# Patient Record
Sex: FEMALE | Race: BLACK OR AFRICAN AMERICAN | HISPANIC OR LATINO | Employment: FULL TIME | URBAN - METROPOLITAN AREA
[De-identification: names, ages, dates, MRNs, and addresses within clinical notes are randomized per-mention and may not be internally consistent; named-entity substitution may affect disease eponyms.]

---

## 2023-07-27 ENCOUNTER — OFFICE VISIT (OUTPATIENT)
Dept: FAMILY MEDICINE CLINIC | Facility: CLINIC | Age: 43
End: 2023-07-27
Payer: COMMERCIAL

## 2023-07-27 VITALS
HEART RATE: 59 BPM | WEIGHT: 172 LBS | DIASTOLIC BLOOD PRESSURE: 90 MMHG | HEIGHT: 62 IN | RESPIRATION RATE: 16 BRPM | OXYGEN SATURATION: 99 % | SYSTOLIC BLOOD PRESSURE: 120 MMHG | BODY MASS INDEX: 31.65 KG/M2

## 2023-07-27 DIAGNOSIS — Z00.00 ANNUAL PHYSICAL EXAM: Primary | ICD-10-CM

## 2023-07-27 DIAGNOSIS — G47.9 SLEEP DISTURBANCE: ICD-10-CM

## 2023-07-27 DIAGNOSIS — K59.00 CONSTIPATION, UNSPECIFIED CONSTIPATION TYPE: ICD-10-CM

## 2023-07-27 DIAGNOSIS — Z11.59 NEED FOR HEPATITIS C SCREENING TEST: ICD-10-CM

## 2023-07-27 DIAGNOSIS — Z13.228 SCREENING FOR METABOLIC DISORDER: ICD-10-CM

## 2023-07-27 DIAGNOSIS — Z11.4 SCREENING FOR HIV (HUMAN IMMUNODEFICIENCY VIRUS): ICD-10-CM

## 2023-07-27 PROCEDURE — 99386 PREV VISIT NEW AGE 40-64: CPT | Performed by: FAMILY MEDICINE

## 2023-07-27 RX ORDER — LANOLIN ALCOHOL/MO/W.PET/CERES
3 CREAM (GRAM) TOPICAL
Qty: 90 TABLET | Refills: 1 | Status: SHIPPED | OUTPATIENT
Start: 2023-07-27

## 2023-07-27 NOTE — PROGRESS NOTES
[address] [facility]    NAME: Ly Haskins  AGE: 41 y.o. SEX: female  : 1980     DATE: 2023     Assessment and Plan:     Problem List Items Addressed This Visit    None  Visit Diagnoses     Annual physical exam    -  Primary    Sleep disturbance        Relevant Medications    melatonin 3 mg    Need for hepatitis C screening test        Relevant Orders    Hepatitis C Antibody    Screening for HIV (human immunodeficiency virus)        Relevant Orders    HIV 1/2 AG/AB w Reflex [facility] for 2 yr old and above    Screening for metabolic disorder        Relevant Orders    CBC and differential    Comprehensive metabolic panel    Hemoglobin A1C    Lipid Panel with Direct LDL reflex    Vitamin D 25 hydroxy    Constipation, unspecified constipation type        Relevant Medications    psyllium (METAMUCIL) 58.6 % powder        Patient received Tdap booster around 5 years ago. Immunizations and preventive care screenings were discussed with patient today. Appropriate education was printed on patient's after visit summary. Counseling:  Alcohol/drug use: discussed moderation in alcohol intake, the recommendations for healthy alcohol use, and avoidance of illicit drug use. Dental Health: discussed importance of regular tooth brushing, flossing, and dental visits. Injury prevention: discussed safety/seat belts, safety helmets, smoke detectors, carbon dioxide detectors, and smoking near bedding or upholstery. Sexual health: discussed sexually transmitted diseases, partner selection, use of condoms, avoidance of unintended pregnancy, and contraceptive alternatives. · Exercise: the importance of regular exercise/physical activity was discussed. Recommend exercise 3-5 times per week for at least 30 minutes. BMI Counseling: Body mass index is 31.46 kg/m².  The BMI is above normal. Nutrition recommendations include decreasing portion sizes, encouraging healthy choices of fruits and vegetables, decreasing fast food intake, consuming healthier snacks, limiting drinks that contain sugar, moderation in carbohydrate intake, increasing intake of lean protein, reducing intake of saturated and trans fat and reducing intake of cholesterol. Exercise recommendations include moderate physical activity 150 minutes/week and exercising 3-5 times per week. No pharmacotherapy was ordered. Rationale for BMI follow-up plan is due to patient being overweight or obese. Depression Screening and Follow-up Plan: Patient was screened for depression during today's encounter. They screened negative with a PHQ-2 score of 2. Return if symptoms worsen or fail to improve. Chief Complaint:     Chief Complaint   Patient presents with   • Physical Exam     New patient. Works remote, lives here in [place], but works for [place]. She is looking for a referral for mental health professional.      History of Present Illness:     Adult Annual Physical   Patient here for a comprehensive physical exam. The patient reports Sleep problem and dysphoric mood. Patient moved from [place] a couple months ago after splitting with her ex-partner. She has 4 children, and 2 of them are living with her now. Her partner is keeping their family dog. Patient states that she is more active and feels energetic whenever she brings the family dog to visit her. However she noticed dysphoric mood and feel depressed without the dog. She works remotely. Sleeps about 4-5 times a day. She is working out at home as much as possible, but now thinking about going to the gym regularly. Trying intermittent fasting on some days. Patient's grandmother passed away from cancer of unknown origin. Diet and Physical Activity  · Diet/Nutrition: well balanced diet. · Exercise: walking.       Depression Screening  PHQ-2/9 Depression Screening    Little interest or pleasure in doing things: 1 - several days  Feeling down, depressed, or hopeless: 1 - several days  PHQ-2 Score: 2  PHQ-2 Interpretation: Negative depression screen       General Health  · Sleep: sleeps poorly and gets 4-6 hours of sleep on average. · Hearing: normal - bilateral.  · Vision: no vision problems. · Dental: regular dental visits. /GYN Health  · Patient is: premenopausal  · Last menstrua l period: 2023     Review of Systems:     Review of Systems   Constitutional: Negative for chills and fever. HENT: Negative for ear pain and sore throat. Eyes: Negative for pain and visual disturbance. Respiratory: Negative for cough and shortness of breath. Cardiovascular: Negative for chest pain and palpitations. Gastrointestinal: Negative for abdominal pain and vomiting. Genitourinary: Negative for dysuria and hematuria. Musculoskeletal: Negative for arthralgias and back pain. Skin: Negative for color change and rash. Neurological: Negative for seizures and syncope. Psychiatric/Behavioral: Positive for dysphoric mood. All other systems reviewed and are negative. Past Medical History:     Past Medical History:   Diagnosis Date   • HPV (human papilloma virus) infection    • Sickle cell trait ([address])       Past Surgical History:     Past Surgical History:   Procedure Laterality Date   •  SECTION      4      Social History:     Social History     Socioeconomic History   • Marital status:      Spouse name: None   • Number of children: None   • Years of education: None   • Highest education level: None   Occupational History   • None   Tobacco Use   • Smoking status: Never   • Smokeless tobacco: Never   Vaping Use   • Vaping Use: Never used   Substance and Sexual Activity   • Alcohol use:  Yes   • Drug use: Not Currently     Types: Marijuana     Comment: Use to smoke marijuana   • Sexual activity: Yes   Other Topics Concern   • None   Social History Narrative   • None     Social Determinants of Health     Financial Resource Strain: Not on file   Food Insecurity: Not on file   Transportation Needs: Not on file   Physical Activity: Not on file   Stress: Not on file   Social Connections: Not on file   Intimate Partner Violence: Not on file   Housing Stability: Not on file      Family History:     Family History   Problem Relation Age of Onset   • Asthma Mother    • Hypertension Mother    • Asthma Brother    • Heart attack Brother    • ADD / ADHD Son    • Asthma Daughter    • Eczema Daughter    • ADD / ADHD Daughter    • Febrile seizures Daughter    • Cancer Maternal Grandmother       Current Medications:     Current Outpatient Medications   Medication Sig Dispense Refill   • melatonin 3 mg Take 1 tablet (3 mg total) by mouth daily at bedtime 90 tablet 1   • psyllium (METAMUCIL) 58.6 % powder Take 1 packet by mouth 3 (three) times a day 660 g 2     No current facility-administered medications for this visit. Allergies:     No Known Allergies   Physical Exam:     /90 (BP Location: Right arm, Patient Position: Sitting, Cuff Size: Standard)   Pulse 59   Resp 16   Ht 5' 2" (1.575 m)   Wt 78 kg (172 lb)   LMP 07/22/2023 (Exact Date)   SpO2 99%   BMI 31.46 kg/m²     Physical Exam  Vitals and nursing note reviewed. Constitutional:       General: She is not in acute distress. Appearance: Normal appearance. She is well-developed. HENT:      Head: Normocephalic and atraumatic. Right Ear: Tympanic membrane, ear canal and external ear normal. There is no impacted cerumen. Left Ear: Tympanic membrane, ear canal and external ear normal. There is no impacted cerumen. Nose: Nose normal. No congestion. Mouth/Throat:      Mouth: Mucous membranes are moist.      Pharynx: No posterior oropharyngeal erythema. Eyes:      Extraocular Movements: Extraocular movements intact. Conjunctiva/sclera: Conjunctivae normal.      Pupils: Pupils are equal, round, and reactive to light. Cardiovascular:      Rate and Rhythm: Normal rate and regular rhythm. Pulses: Normal pulses. Heart sounds: Normal heart sounds. No murmur heard. Pulmonary:      Effort: Pulmonary effort is normal. No respiratory distress. Breath sounds: Normal breath sounds. Abdominal:      General: Bowel sounds are normal.      Palpations: Abdomen is soft. Tenderness: There is no abdominal tenderness. Musculoskeletal:         General: No swelling. Cervical back: Neck supple. Right lower leg: No edema. Left lower leg: No edema. Skin:     General: Skin is warm and dry. Capillary Refill: Capillary refill takes less than 2 seconds. Neurological:      General: No focal deficit present. Mental Status: She is alert. Motor: No weakness.    Psychiatric:         Mood and Affect: Mood normal.          Javier Jett MD  [facility]

## 2023-10-12 ENCOUNTER — OFFICE VISIT (OUTPATIENT)
Dept: FAMILY MEDICINE CLINIC | Facility: CLINIC | Age: 43
End: 2023-10-12
Payer: COMMERCIAL

## 2023-10-12 VITALS
SYSTOLIC BLOOD PRESSURE: 120 MMHG | DIASTOLIC BLOOD PRESSURE: 82 MMHG | OXYGEN SATURATION: 96 % | HEIGHT: 62 IN | WEIGHT: 181.1 LBS | HEART RATE: 69 BPM | BODY MASS INDEX: 33.33 KG/M2 | RESPIRATION RATE: 13 BRPM

## 2023-10-12 DIAGNOSIS — R45.86 MOOD CHANGE: ICD-10-CM

## 2023-10-12 DIAGNOSIS — G56.03 BILATERAL CARPAL TUNNEL SYNDROME: Primary | ICD-10-CM

## 2023-10-12 PROCEDURE — 99213 OFFICE O/P EST LOW 20 MIN: CPT | Performed by: FAMILY MEDICINE

## 2023-10-12 NOTE — PATIENT INSTRUCTIONS
Outpatient [facility] Suicide and Crisis [id number] (call or text)   Crisis Text Line Text HOME to [phone] or Message on Facebook ([address])   [facility] [phone]   [address] [place] [address] [phone] (call or text)   Twan Copeland [phone]     [facility] for Counseling and Psychotherapy Services  Address: [address] 101 [facility], [facility]  Phone: [phone]; Children and adults of all ages for counseling; In-person and/or telehealth; Accepts Medicare and out of network benefits    [address]  Address: [address] [facility] 85 [facility], [address] [place] [address] 64 [facility]  Phone: [phone]  [address] [place] [address]; Ages 13+ for psychotherapy, medication management and evaluation; Accepts Cigna, Horizon BC BS, Humana Inc, [employer], Aetna, and [facility]  Address: [address] [address], [address] [facility] [place], [address] [place] [address]  Phone: [phone]  English; Ages 5+ for individual, family, and couples counseling; In-person and/or telehealth services; Accepts Horizon BCBS, [place], Diane Adknowledge, and Kaseya  Address: [address], [place], [address]  Phone: [phone]  Bilingual; Ages 4+ for family, individual, play therapy, psychiatric evaluations, and medication monitoring; Accepts Medicaid and Medicare    [facility]  Address: [address], [address] [facility]  Phone:  [phone]  Bilingual; Ages 4+ for counseling and 18+ for psychiatry and med management; [address] for [facility]  Address: [address] [place] [address]  Phone: [phone]  Bilingual; Ages 5+ for counseling and psychiatry; Accept Medicare, Medicaid, and some commercial insurances  [facility]  Various locations  Phone: [phone] Ext.  80  Bilingual; Ages 3+ for counseling, psychiatry, and medication management; Accepts ONLY Medicaid    [facility], [facility]  Address: [address]  Phone: [phone]; Ages 6+ for counseling; Accepts Medicare, Medicaid, and some commercial insurances      [facility]  Address: [address] [facility] [address], [address], 6[address] [facility]  Phone: [phone]  Bilingual; Ages 3+ for counseling; In-person and/or telehealth; Accept Horizon [place], [place], [employer], Optum, Two [facility], [place], Allie Rios, [place] and self-pay    [facility]  Address: [address] [facility] [address]  Phone: [phone]  English; Ages 18+ for counseling; Accepts Medicare, Medicaid, and some commercial insurances    [address]  Address: [address]  Phone: [phone]  English; Ages 18+ for counseling; Accepts Medicare, Medicaid, and some commercial insurances    DARIO Iqbal, LCSW  Address: [address]  Phone: [phone]  [address] [place] [address]; Ages 10+ for counseling; Accepts Medicare, Medicaid, and some [address]  Address: [address] [address]  Phone: [phone]  English; Ages 6+ for counseling, psychiatry, and medication management; ONLY telehealth; Accepts Medicaid, Amerigroup, Michael Fontan, [facility], and Estée Gabriel (Medication Management Only)    [facility]  Address: [address]  Phone: [phone]  English; Ages 5+ for psychiatric evaluation and medication management; Accepts self-pay, Cigna, [employer] (HMO & PPO), Most BC/BS plans, [place], Two [facility] and [facility]  Address: [address]  Phone: [phone]  English; Ages 10+ for counseling;  Accepts Medicare and some [address] [facility]  Address: [address] [place] [address] [address]  Phone: [phone]  Bilingual; Ages 3+ for counseling, psychiatry, and medication management; Accepts Medicaid, Medicare, and [address]  Address: [address]  Phone: [phone]  Bilingual; Ages 5+ for counseling and psychiatry; Accepts Medicaid insurance; Offer medication management    [address] [facility]  Address: 1501 [facility] 1000 [facility] [address]  Phone: [phone]  English; Children and adults of all ages for counseling, psychiatric evaluation and medication management; Accepts Medicare and some commercial plans    [facility]  Address: [address]  Intake Number: [phone]  Phone: [phone]  Bilingual; Ages 5+ for counseling, psychiatry, and medication management; Does NOT accept Medicaid; Offers therapy, psychiatry, and medication management    [facility]  Address: [facility], [address] [place] [address]  Phone: [phone]  Bilingual; Only ages 11 to 24 for behavioral health, intellectual/developmental disability, or substance use; Accepts Medicaid, Medicare, and some commercial plans    [facility])  Address: [address]  Phone: [phone]  English; Ages 2-20 children for counseling; Accepts ONLY Medicaid    [facility]  Address: [address] [place] [address]  Phone: [phone]  English; Ages 4+ for counseling; Accepts Medicaid and some commercial insurances    For any additional questions or concerns, please contact the [address] [place] [address] or the office for further assistance.   Danielle Hatch, DARIO [facility] [phone]

## 2023-10-12 NOTE — PROGRESS NOTES
[facility] Office visit    Assessment/Plan:     1. Bilateral carpal tunnel syndrome  -     Ambulatory Referral to Orthopedic Surgery; Future    2. Mood change  -     Ambulatory referral to [employer]; Future    Referral sent to hand orthopedics, Dr. Carmelina Gomez for carpal tunnel syndrome. Recommend using wrist braces. Recommend to alternate tylenol and ibuprofen prior to seeing the orthopedics. As for the letter for emotional support animal, behavioral/psych health referral was sent. Also recommended patient to certify her dog as emotional support animal.      Return if symptoms worsen or fail to improve. Subjective:   STEFANIA Handley is a 41 y.o. female presents today to be evaluated for worsening wrist and hand pains bilaterally. Patient was diagnosed with carpal tunnel on bilateral hands about a year ago in [place]. Patient states she was prescribed a medication to be taken at night about 1 year ago. She stopped taking the medication a couple months ago with improved symptoms. She is unable to recall the name of the medication. Patient states she underwent EMS testing to be diagnosed for carpal tunnel syndrome. She currently uses ergonomic mouse and keyboard at work. She works as a . Patient has not had finger injections. She also requested to be seen by therapist regarding a letter to have emotional support dog. Review of Systems   Constitutional:  Negative for chills and fever. HENT:  Negative for ear pain and sore throat. Eyes:  Negative for pain and visual disturbance. Respiratory:  Negative for cough and shortness of breath. Cardiovascular:  Negative for chest pain and palpitations. Gastrointestinal:  Negative for abdominal pain and vomiting. Genitourinary:  Negative for dysuria and hematuria. Musculoskeletal:  Negative for arthralgias and back pain. Skin:  Negative for color change and rash.    Neurological:  Negative for seizures and syncope. All other systems reviewed and are negative. Objective:     /82 (BP Location: Left arm, Patient Position: Sitting, Cuff Size: Standard)   Pulse 69   Resp 13   Ht 5' 2" (1.575 m)   Wt 82.1 kg (181 lb 1.6 oz)   SpO2 96%   BMI 33.12 kg/m²      Physical Exam  Vitals reviewed. Constitutional:       General: She is not in acute distress. Appearance: Normal appearance. HENT:      Head: Normocephalic and atraumatic. Eyes:      Pupils: Pupils are equal, round, and reactive to light. Cardiovascular:      Rate and Rhythm: Normal rate and regular rhythm. Pulses: Normal pulses. Heart sounds: Normal heart sounds. No murmur heard. Pulmonary:      Effort: Pulmonary effort is normal. No respiratory distress. Breath sounds: Normal breath sounds. No wheezing. Abdominal:      General: Bowel sounds are normal.      Palpations: Abdomen is soft. Musculoskeletal:      Comments: Slight thenar eminence atrophy on left hand compared to the right. No joint swelling  Numbness on tinel's and phalen's maneuver   Normal strength    Skin:     General: Skin is warm and dry. Neurological:      General: No focal deficit present. Mental Status: She is alert and oriented to person, place, and time.    Psychiatric:         Mood and Affect: Mood normal.         Behavior: Behavior normal.          ** Please Note: This note has been constructed using a voice recognition system **     Julia Humphrey MD  10/12/23  6:02 PM

## 2023-10-13 ENCOUNTER — TELEPHONE (OUTPATIENT)
Dept: PSYCHIATRY | Facility: CLINIC | Age: 43
End: 2023-10-13

## 2023-10-13 NOTE — TELEPHONE ENCOUNTER
Spoke to patient in regards to routine referral, inform patient of the wait list and patient as been placed on proper wait list. THOMAS-danelle or [place], in person or virtual. Male or female.

## 2023-10-20 ENCOUNTER — OFFICE VISIT (OUTPATIENT)
Dept: OBGYN CLINIC | Facility: CLINIC | Age: 43
End: 2023-10-20
Payer: COMMERCIAL

## 2023-10-20 VITALS
SYSTOLIC BLOOD PRESSURE: 135 MMHG | DIASTOLIC BLOOD PRESSURE: 78 MMHG | WEIGHT: 181 LBS | BODY MASS INDEX: 33.31 KG/M2 | HEART RATE: 78 BPM | HEIGHT: 62 IN

## 2023-10-20 DIAGNOSIS — R20.0 NUMBNESS AND TINGLING IN BOTH HANDS: Primary | ICD-10-CM

## 2023-10-20 DIAGNOSIS — G56.03 BILATERAL CARPAL TUNNEL SYNDROME: ICD-10-CM

## 2023-10-20 DIAGNOSIS — R20.2 NUMBNESS AND TINGLING IN BOTH HANDS: Primary | ICD-10-CM

## 2023-10-20 PROCEDURE — 99203 OFFICE O/P NEW LOW 30 MIN: CPT | Performed by: STUDENT IN AN ORGANIZED HEALTH CARE EDUCATION/TRAINING PROGRAM

## 2023-10-20 NOTE — PROGRESS NOTES
ASSESSMENT/PLAN:    Assessment:   Bilateral hand numbness and tingling     Plan: It was discussed with Thuy Núñez that her symptoms are not consistent with carpal tunnel syndrome as most of her numbness is on the dorsum of her hand and sudden numbness in the entire upper extremity at night . She reports she previously had an EMG/NCS preformed diagnosing her with CTS last year. I asked if she could provide us with this study, she will try. Bilateral upper extremity EMG was ordered to further evaluate the numbness and tingling. If she is able to provide me with her previous EMG, she may cancel the one that I ordered. Follow Up: After Testing    To Do Next Visit:  Review EMG results     _____________________________________________________  CHIEF COMPLAINT:  Chief Complaint   Patient presents with   • Left Hand - Numbness   • Right Hand - Numbness         SUBJECTIVE:  Leigh Ann Smith is a 41 y.o. female who presents with bilateral hand numbness and tingling. I am seeing hTuy Núñez in consultation at the request of Dr. Isidoro Ledezma MD. She states she was diagnosed with carpal tunnel syndrome in [facility] approx. 1 year ago by her PCP. She did undergo a EMG at that time. She notes intermittent numbness and tingling for approx. 4 years. She feels her symptoms have worsened, as she is now having pain as well as numbness and tingling. Numbness and tingling is to the dorsal aspect of her hand. She did make her work station more ergonomic. Her symptoms can wake her from sleep at night when her whole right arm goes numb. At times she states her left index finger will lock in extension.    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     PAST MEDICAL HISTORY:  Past Medical History:   Diagnosis Date   • HPV (human papilloma virus) infection    • Sickle cell trait ([address])        PAST SURGICAL HISTORY:  Past Surgical History:   Procedure Laterality Date   •  SECTION      4       FAMILY HISTORY:  Family History   Problem Relation Age of Onset   • Asthma Mother    • Hypertension Mother    • Asthma Brother    • Heart attack Brother    • ADD / ADHD Son    • Asthma Daughter    • Eczema Daughter    • ADD / ADHD Daughter    • Febrile seizures Daughter    • Cancer Maternal Grandmother        SOCIAL HISTORY:  Social History     Tobacco Use   • Smoking status: Never   • Smokeless tobacco: Never   Vaping Use   • Vaping Use: Never used   Substance Use Topics   • Alcohol use: Yes   • Drug use: Not Currently     Types: Marijuana     Comment: Use to smoke marijuana       MEDICATIONS:    Current Outpatient Medications:   •  melatonin 3 mg, Take 1 tablet (3 mg total) by mouth daily at bedtime, Disp: 90 tablet, Rfl: 1  •  psyllium (METAMUCIL) 58.6 % powder, Take 1 packet by mouth 3 (three) times a day, Disp: 660 g, Rfl: 2    ALLERGIES:  No Known Allergies    REVIEW OF SYSTEMS:  Pertinent items are noted in HPI. A comprehensive review of systems was negative.     LABS:  HgA1c: No results found for: "HGBA1C"  BMP: No results found for: "GLUCOSE", "CALCIUM", "NA", "K", "CO2", "CL", "BUN", "CREATININE"      _____________________________________________________  PHYSICAL EXAMINATION:  Vital signs: /78   Pulse 78   Ht 5' 2" (1.575 m)   Wt 82.1 kg (181 lb)   BMI 33.11 kg/m²   General: well developed and well nourished, alert, oriented times 3, and appears comfortable  Psychiatric: Normal  HEENT: Trachea Midline, No torticollis  Cardiovascular: No discernable arrhythmia  Pulmonary: No wheezing or stridor  Abdomen: No rebound or guarding  Extremities: No peripheral edema  Skin: No masses, erythema, lacerations, fluctation, ulcerations  Neurovascular: Sensation Intact to the Median, Ulnar, Radial Nerve, Motor Intact to the Median, Ulnar, Radial Nerve, and Pulses Intact    MUSCULOSKELETAL EXAMINATION:    Bilateral upper extremities:   No erythema, ecchymosis or edema   - Durkan's bilaterally   - tinel's at the carpal tunnel but does cause radiating symptoms up the forearm on the right  + tinel's on the left     FPL 5/5 bilaterally   APB 5/5 bilaterally   Mild thenar atrophy on the right   No thenar atrophy on the left   2 point discrimination intact at 5 mm bilaterally in the median and ulnar nerve distribution    Shoulder abduction 5/5 bilaterally   Elbow flexion 5/5 bilaterally   Elbow extension 5/5 bilaterally   Wrist extension 5/5 bilaterally   Wrist flexion 5/5 bilaterally   Intrinsics 5/5 bilaterally    strength 5/5    + stoddard's on the left   - stoddard's on the right   - Spurling's bilaterally   No A1 pulley tenderness left index finger     _____________________________________________________  STUDIES REVIEWED:  No Studies to review      PROCEDURES PERFORMED:  Procedures  No Procedures performed today      Scribe Attestation    I,:  Elfredia Blizzard am acting as a scribe while in the presence of the attending physician.:       I,:  Celestine Corcoran MD personally performed the services described in this documentation    as scribed in my presence.:

## 2024-02-20 ENCOUNTER — OFFICE VISIT (OUTPATIENT)
Dept: FAMILY MEDICINE CLINIC | Facility: CLINIC | Age: 44
End: 2024-02-20
Payer: COMMERCIAL

## 2024-02-20 VITALS
SYSTOLIC BLOOD PRESSURE: 104 MMHG | WEIGHT: 181.25 LBS | HEART RATE: 63 BPM | DIASTOLIC BLOOD PRESSURE: 68 MMHG | BODY MASS INDEX: 34.22 KG/M2 | RESPIRATION RATE: 21 BRPM | OXYGEN SATURATION: 99 % | HEIGHT: 61 IN | TEMPERATURE: 98.2 F

## 2024-02-20 DIAGNOSIS — R21 RASH AND NONSPECIFIC SKIN ERUPTION: Primary | ICD-10-CM

## 2024-02-20 PROCEDURE — 99213 OFFICE O/P EST LOW 20 MIN: CPT | Performed by: FAMILY MEDICINE

## 2024-02-20 RX ORDER — NYSTATIN 100000 U/G
CREAM TOPICAL 2 TIMES DAILY
Qty: 30 G | Refills: 0 | Status: SHIPPED | OUTPATIENT
Start: 2024-02-20

## 2024-02-20 RX ORDER — ELECTROLYTES/DEXTROSE
SOLUTION, ORAL ORAL
COMMUNITY

## 2024-02-20 NOTE — PROGRESS NOTES
Family Medicine Office Visit  Molina Frias 43 y.o. female   MRN: [id number] : 1980  ENCOUNTER: 2024    Assessment and Plan     1. Rash and nonspecific skin eruption  Assessment & Plan:  Patient with hyperpigmentation at oral commissures   Likely cheilosis, new soap could have been initial trigger  Trial of nystatin cream sent  RTO if no improvement    Orders:  -     nystatin (MYCOSTATIN) cream; Apply topically 2 (two) times a day        Return if symptoms worsen or fail to improve.      Associated orders were discussed and explained to the patient, they expressed understanding and acceptance with A/P. Patient will call the office if any further questions/concerns.     Assessment and plan was discussed with attending physician    Chief Complaint     Chief Complaint   Patient presents with    Follow-up     Eczema - patient stopped using a face soap and now she has white and black spots on her face around her mouth        History of Present Illness     Molina Frias is a 43 y.o.-year-old female who presents today for rash.    Rash  This is a new problem. Episode onset: 4 weeks ago. The affected locations include the face (around lips). She was exposed to a new detergent/soap. Pertinent negatives include no cough, facial edema, fever, itching, joint pain or shortness of breath. Past treatments include moisturizer (vaseline). The treatment provided mild relief. There were no sick contacts.       Current Outpatient Medications on File Prior to Visit   Medication Sig    melatonin 3 mg Take 1 tablet (3 mg total) by mouth daily at bedtime    Multiple Vitamin (Multivitamin Adult) TABS Take by mouth    psyllium (METAMUCIL) 58.6 % powder Take 1 packet by mouth 3 (three) times a day (Patient not taking: Reported on 2024)       Review of Systems     Review of Systems   Constitutional:  Negative for chills and fever.   Respiratory:  Negative for cough and shortness of breath.    Cardiovascular:  Negative for  "chest pain.   Gastrointestinal:  Negative for abdominal pain.   Musculoskeletal:  Negative for joint pain.   Skin:  Positive for rash. Negative for itching.       Objective     /68 (BP Location: Right arm, Patient Position: Sitting, Cuff Size: Large)   Pulse 63   Temp 98.2 °F (36.8 °C) (Temporal)   Resp 21   Ht 5' 1\" (1.549 m)   Wt 82.2 kg (181 lb 4 oz)   LMP 01/24/2024 (Approximate)   SpO2 99%   BMI 34.25 kg/m²     Physical Exam  Constitutional:       General: She is not in acute distress.     Appearance: Normal appearance.   Cardiovascular:      Rate and Rhythm: Normal rate and regular rhythm.   Pulmonary:      Effort: Pulmonary effort is normal. No respiratory distress.      Breath sounds: Normal breath sounds.   Skin:     Findings: Rash (hyperpigmentation at oral commissures) present.   Neurological:      Mental Status: She is alert.           Darlyn Lewis MD  Family Medicine PGY-3  [facility]         Parts of this note were dictated using M*Modal dictation software and may have sounds-like errors due to variation in pronunciation.  "

## 2024-02-23 ENCOUNTER — HOSPITAL ENCOUNTER (OUTPATIENT)
Dept: NEUROLOGY | Facility: HOSPITAL | Age: 44
End: 2024-02-23
Attending: STUDENT IN AN ORGANIZED HEALTH CARE EDUCATION/TRAINING PROGRAM
Payer: COMMERCIAL

## 2024-02-23 DIAGNOSIS — R20.0 NUMBNESS AND TINGLING IN BOTH HANDS: ICD-10-CM

## 2024-02-23 DIAGNOSIS — R20.2 NUMBNESS AND TINGLING IN BOTH HANDS: ICD-10-CM

## 2024-02-23 PROCEDURE — 95912 NRV CNDJ TEST 11-12 STUDIES: CPT | Performed by: PSYCHIATRY & NEUROLOGY

## 2024-02-23 PROCEDURE — 95886 MUSC TEST DONE W/N TEST COMP: CPT | Performed by: PSYCHIATRY & NEUROLOGY

## 2024-02-25 PROBLEM — R21 RASH AND NONSPECIFIC SKIN ERUPTION: Status: ACTIVE | Noted: 2024-02-25

## 2024-02-25 NOTE — ASSESSMENT & PLAN NOTE
Patient with hyperpigmentation at oral commissures   Likely cheilosis, new soap could have been initial trigger  Trial of nystatin cream sent  RTO if no improvement

## 2024-08-22 ENCOUNTER — APPOINTMENT (EMERGENCY)
Dept: RADIOLOGY | Facility: HOSPITAL | Age: 44
End: 2024-08-22
Payer: COMMERCIAL

## 2024-08-22 ENCOUNTER — HOSPITAL ENCOUNTER (EMERGENCY)
Facility: HOSPITAL | Age: 44
Discharge: HOME | End: 2024-08-22
Attending: STUDENT IN AN ORGANIZED HEALTH CARE EDUCATION/TRAINING PROGRAM
Payer: COMMERCIAL

## 2024-08-22 VITALS
OXYGEN SATURATION: 100 % | BODY MASS INDEX: 34.55 KG/M2 | RESPIRATION RATE: 16 BRPM | DIASTOLIC BLOOD PRESSURE: 65 MMHG | WEIGHT: 183 LBS | HEART RATE: 85 BPM | HEIGHT: 61 IN | SYSTOLIC BLOOD PRESSURE: 118 MMHG | TEMPERATURE: 99.4 F

## 2024-08-22 DIAGNOSIS — N12 PYELONEPHRITIS: Primary | ICD-10-CM

## 2024-08-22 LAB
ALBUMIN SERPL BCG-MCNC: 4.5 G/DL (ref 3.5–5)
ALP SERPL-CCNC: 89 U/L (ref 34–104)
ALT SERPL W P-5'-P-CCNC: 13 U/L (ref 7–52)
ANION GAP SERPL CALCULATED.3IONS-SCNC: 11 MMOL/L (ref 4–13)
AST SERPL W P-5'-P-CCNC: 17 U/L (ref 13–39)
BACTERIA UR QL AUTO: ABNORMAL /HPF
BASOPHILS # BLD AUTO: 0.09 THOUSANDS/ÂΜL (ref 0–0.1)
BASOPHILS NFR BLD AUTO: 1 % (ref 0–1)
BILIRUB SERPL-MCNC: 0.61 MG/DL (ref 0.2–1)
BILIRUB UR QL STRIP: NEGATIVE
BUN SERPL-MCNC: 9 MG/DL (ref 5–25)
CALCIUM SERPL-MCNC: 9.6 MG/DL (ref 8.4–10.2)
CHLORIDE SERPL-SCNC: 99 MMOL/L (ref 96–108)
CLARITY UR: CLEAR
CO2 SERPL-SCNC: 24 MMOL/L (ref 21–32)
COLOR UR: YELLOW
CREAT SERPL-MCNC: 0.99 MG/DL (ref 0.6–1.3)
EOSINOPHIL # BLD AUTO: 0.13 THOUSAND/ÂΜL (ref 0–0.61)
EOSINOPHIL NFR BLD AUTO: 1 % (ref 0–6)
ERYTHROCYTE [DISTWIDTH] IN BLOOD BY AUTOMATED COUNT: 13.6 % (ref 11.6–15.1)
EXT PREGNANCY TEST URINE: NEGATIVE
EXT. CONTROL: NORMAL
GFR SERPL CREATININE-BSD FRML MDRD: 70 ML/MIN/1.73SQ M
GLUCOSE SERPL-MCNC: 89 MG/DL (ref 65–140)
GLUCOSE UR STRIP-MCNC: NEGATIVE MG/DL
HCT VFR BLD AUTO: 39.1 % (ref 34.8–46.1)
HGB BLD-MCNC: 12.8 G/DL (ref 11.5–15.4)
HGB UR QL STRIP.AUTO: ABNORMAL
IMM GRANULOCYTES # BLD AUTO: 0.04 THOUSAND/UL (ref 0–0.2)
IMM GRANULOCYTES NFR BLD AUTO: 0 % (ref 0–2)
KETONES UR STRIP-MCNC: ABNORMAL MG/DL
LEUKOCYTE ESTERASE UR QL STRIP: ABNORMAL
LIPASE SERPL-CCNC: 11 U/L (ref 11–82)
LYMPHOCYTES # BLD AUTO: 1.79 THOUSANDS/ÂΜL (ref 0.6–4.47)
LYMPHOCYTES NFR BLD AUTO: 16 % (ref 14–44)
MCH RBC QN AUTO: 27.5 PG (ref 26.8–34.3)
MCHC RBC AUTO-ENTMCNC: 32.7 G/DL (ref 31.4–37.4)
MCV RBC AUTO: 84 FL (ref 82–98)
MONOCYTES # BLD AUTO: 1.22 THOUSAND/ÂΜL (ref 0.17–1.22)
MONOCYTES NFR BLD AUTO: 11 % (ref 4–12)
MUCOUS THREADS UR QL AUTO: ABNORMAL
NEUTROPHILS # BLD AUTO: 7.73 THOUSANDS/ÂΜL (ref 1.85–7.62)
NEUTS SEG NFR BLD AUTO: 71 % (ref 43–75)
NITRITE UR QL STRIP: NEGATIVE
NON-SQ EPI CELLS URNS QL MICRO: ABNORMAL /HPF
NRBC BLD AUTO-RTO: 0 /100 WBCS
PH UR STRIP.AUTO: 5 [PH]
PLATELET # BLD AUTO: 384 THOUSANDS/UL (ref 149–390)
PMV BLD AUTO: 10.5 FL (ref 8.9–12.7)
POTASSIUM SERPL-SCNC: 3.5 MMOL/L (ref 3.5–5.3)
PROT SERPL-MCNC: 9 G/DL (ref 6.4–8.4)
PROT UR STRIP-MCNC: NEGATIVE MG/DL
RBC # BLD AUTO: 4.66 MILLION/UL (ref 3.81–5.12)
RBC #/AREA URNS AUTO: ABNORMAL /HPF
SODIUM SERPL-SCNC: 134 MMOL/L (ref 135–147)
SP GR UR STRIP.AUTO: 1.02 (ref 1–1.03)
UROBILINOGEN UR STRIP-ACNC: <2 MG/DL
WBC # BLD AUTO: 11 THOUSAND/UL (ref 4.31–10.16)
WBC #/AREA URNS AUTO: ABNORMAL /HPF

## 2024-08-22 PROCEDURE — 81025 URINE PREGNANCY TEST: CPT | Performed by: STUDENT IN AN ORGANIZED HEALTH CARE EDUCATION/TRAINING PROGRAM

## 2024-08-22 PROCEDURE — 87186 SC STD MICRODIL/AGAR DIL: CPT | Performed by: STUDENT IN AN ORGANIZED HEALTH CARE EDUCATION/TRAINING PROGRAM

## 2024-08-22 PROCEDURE — 81001 URINALYSIS AUTO W/SCOPE: CPT | Performed by: STUDENT IN AN ORGANIZED HEALTH CARE EDUCATION/TRAINING PROGRAM

## 2024-08-22 PROCEDURE — 80053 COMPREHEN METABOLIC PANEL: CPT | Performed by: STUDENT IN AN ORGANIZED HEALTH CARE EDUCATION/TRAINING PROGRAM

## 2024-08-22 PROCEDURE — 36415 COLL VENOUS BLD VENIPUNCTURE: CPT | Performed by: STUDENT IN AN ORGANIZED HEALTH CARE EDUCATION/TRAINING PROGRAM

## 2024-08-22 PROCEDURE — 87086 URINE CULTURE/COLONY COUNT: CPT | Performed by: STUDENT IN AN ORGANIZED HEALTH CARE EDUCATION/TRAINING PROGRAM

## 2024-08-22 PROCEDURE — 99284 EMERGENCY DEPT VISIT MOD MDM: CPT

## 2024-08-22 PROCEDURE — 96365 THER/PROPH/DIAG IV INF INIT: CPT

## 2024-08-22 PROCEDURE — 99285 EMERGENCY DEPT VISIT HI MDM: CPT | Performed by: STUDENT IN AN ORGANIZED HEALTH CARE EDUCATION/TRAINING PROGRAM

## 2024-08-22 PROCEDURE — 85025 COMPLETE CBC W/AUTO DIFF WBC: CPT | Performed by: STUDENT IN AN ORGANIZED HEALTH CARE EDUCATION/TRAINING PROGRAM

## 2024-08-22 PROCEDURE — 74177 CT ABD & PELVIS W/CONTRAST: CPT

## 2024-08-22 PROCEDURE — 87077 CULTURE AEROBIC IDENTIFY: CPT | Performed by: STUDENT IN AN ORGANIZED HEALTH CARE EDUCATION/TRAINING PROGRAM

## 2024-08-22 PROCEDURE — 96375 TX/PRO/DX INJ NEW DRUG ADDON: CPT

## 2024-08-22 PROCEDURE — 83690 ASSAY OF LIPASE: CPT | Performed by: STUDENT IN AN ORGANIZED HEALTH CARE EDUCATION/TRAINING PROGRAM

## 2024-08-22 RX ORDER — ONDANSETRON 4 MG/1
4 TABLET, ORALLY DISINTEGRATING ORAL EVERY 6 HOURS PRN
Qty: 12 TABLET | Refills: 0 | Status: SHIPPED | OUTPATIENT
Start: 2024-08-22

## 2024-08-22 RX ORDER — CEFTRIAXONE 1 G/50ML
1000 INJECTION, SOLUTION INTRAVENOUS ONCE
Status: COMPLETED | OUTPATIENT
Start: 2024-08-22 | End: 2024-08-22

## 2024-08-22 RX ORDER — KETOROLAC TROMETHAMINE 30 MG/ML
15 INJECTION, SOLUTION INTRAMUSCULAR; INTRAVENOUS ONCE
Status: COMPLETED | OUTPATIENT
Start: 2024-08-22 | End: 2024-08-22

## 2024-08-22 RX ORDER — CEFPODOXIME PROXETIL 200 MG/1
200 TABLET, FILM COATED ORAL 2 TIMES DAILY
Qty: 20 TABLET | Refills: 0 | Status: SHIPPED | OUTPATIENT
Start: 2024-08-22 | End: 2024-09-01

## 2024-08-22 RX ADMIN — CEFTRIAXONE 1000 MG: 1 INJECTION, SOLUTION INTRAVENOUS at 17:12

## 2024-08-22 RX ADMIN — KETOROLAC TROMETHAMINE 15 MG: 30 INJECTION, SOLUTION INTRAMUSCULAR at 15:18

## 2024-08-22 RX ADMIN — IOHEXOL 100 ML: 350 INJECTION, SOLUTION INTRAVENOUS at 16:11

## 2024-08-22 NOTE — DISCHARGE INSTRUCTIONS
You were seen in the emergency department for right-sided abdominal pain.  You were found to have pyelonephritis.  He will be started on antibiotics.  Please take as prescribed.  Please follow-up with your family doctor.  Return to the emergency room for any new or concerning symptoms.

## 2024-08-23 ENCOUNTER — OFFICE VISIT (OUTPATIENT)
Age: 44
End: 2024-08-23

## 2024-08-23 VITALS
TEMPERATURE: 97.5 F | OXYGEN SATURATION: 98 % | WEIGHT: 182.4 LBS | RESPIRATION RATE: 18 BRPM | HEART RATE: 72 BPM | HEIGHT: 61 IN | BODY MASS INDEX: 34.44 KG/M2 | DIASTOLIC BLOOD PRESSURE: 84 MMHG | SYSTOLIC BLOOD PRESSURE: 120 MMHG

## 2024-08-23 DIAGNOSIS — Z59.9 FINANCIAL DIFFICULTIES: ICD-10-CM

## 2024-08-23 DIAGNOSIS — N39.0 RECURRENT UTI (URINARY TRACT INFECTION): ICD-10-CM

## 2024-08-23 DIAGNOSIS — Z00.00 ANNUAL PHYSICAL EXAM: Primary | ICD-10-CM

## 2024-08-23 LAB — SL AMB POCT HEMOGLOBIN AIC: 5.5 (ref ?–6.5)

## 2024-08-23 PROCEDURE — 99386 PREV VISIT NEW AGE 40-64: CPT | Performed by: FAMILY MEDICINE

## 2024-08-23 PROCEDURE — 83036 HEMOGLOBIN GLYCOSYLATED A1C: CPT | Performed by: FAMILY MEDICINE

## 2024-08-23 RX ORDER — SULFAMETHOXAZOLE/TRIMETHOPRIM 800-160 MG
1 TABLET ORAL 2 TIMES DAILY
Qty: 14 TABLET | Refills: 0 | Status: CANCELLED | OUTPATIENT
Start: 2024-08-23 | End: 2024-08-30

## 2024-08-23 SDOH — ECONOMIC STABILITY - INCOME SECURITY: PROBLEM RELATED TO HOUSING AND ECONOMIC CIRCUMSTANCES, UNSPECIFIED: Z59.9

## 2024-08-23 NOTE — PROGRESS NOTES
Adult Annual Physical  Name: Molina Frias      : 1980      MRN: [id number]  Encounter Provider: Aida Carranza MD  Encounter Date: 2024   Encounter department: [facility]    Assessment & Plan   1. Annual physical exam  Comments:  reassuing exam. Counseled on emergency symptoms. Will need to close care gaps once social work helps with finances.  2. BMI 34.0-34.9,adult  -     POCT hemoglobin A1c  3. Financial difficulties  -     Ambulatory Referral to Social Work Care Management Program; Future  -     Ambulatory Referral to Complex Care Management Program; Future  4. Recurrent UTI (urinary tract infection)  Assessment & Plan:  Will consider prophylactic UTI medication but will focus on reducing constipation right now. Patient will monitor for fevers, severe pain, nausea/vomiting, reaction to cefpodoxime given from her ED visit yesterday. Explained that severe symptoms require emergency room trip for close monitoring  Immunizations and preventive care screenings were discussed with patient today. Appropriate education was printed on patient's after visit summary.    Counseling:  Dental Health: discussed importance of regular tooth brushing, flossing, and dental visits.    BMI Counseling: Body mass index is 34.46 kg/m². The BMI is above normal. Nutrition recommendations include consuming healthier snacks. Exercise recommendations include strength training exercises. No pharmacotherapy was ordered. Patient referred to PCP. Rationale for BMI follow-up plan is due to patient being overweight or obese.     Depression Screening and Follow-up Plan: Patient was screened for depression during today's encounter. They screened negative with a PHQ-2 score of 0.    Tobacco Cessation Counseling: Tobacco cessation counseling was provided. The patient is sincerely urged to quit consumption of tobacco. She is not ready to quit tobacco. Medication options discussed. Side effects of  "medication not discussed. Patient refused medication.         History of Present Illness     Adult Annual Physical:  Patient presents for annual physical.     Diet and Physical Activity:  - Diet/Nutrition: well balanced diet and consuming 3-5 servings of fruits/vegetables daily.  - Exercise: 3-4 times a week on average.    Depression Screening:  - PHQ-2 Score: 0  - PHQ-9 Score: 0    General Health:  - Sleep: sleeps well.  - Hearing: normal hearing right ear and normal hearing left ear.  - Vision: no vision problems. wearing glasses  - Dental:. waiting on social work and complex care management    /GYN Health:  - Follows with GYN: yes.   - Menopause: perimenopausal.   - Last menstrual cycle: 8/12/2024.   - History of STDs: no  - Contraception:. tubal ligation      Advanced Care Planning:  - Has an advanced directive?: no    - Has a durable medical POA?: no    - ACP document given to patient?: yes      Review of Systems   Constitutional:  Negative for chills and fever.   HENT:  Negative for ear pain and sore throat.    Eyes:  Negative for pain and visual disturbance.   Respiratory:  Negative for cough and shortness of breath.    Cardiovascular:  Negative for chest pain and palpitations.   Gastrointestinal:  Negative for abdominal pain and vomiting.   Endocrine:        Menstrual cycles less and less often   Genitourinary:  Negative for dysuria and hematuria.   Musculoskeletal:  Negative for arthralgias and back pain.   Skin:  Negative for color change and rash.   Neurological:  Negative for seizures and syncope.   Hematological:  Negative for adenopathy.   Psychiatric/Behavioral:  Negative for behavioral problems.    All other systems reviewed and are negative.        Objective     /84 (BP Location: Left arm, Patient Position: Sitting, Cuff Size: Standard)   Pulse 72   Temp 97.5 °F (36.4 °C) (Axillary)   Resp 18   Ht 5' 1\" (1.549 m)   Wt 82.7 kg (182 lb 6.4 oz)   LMP 08/15/2024 (Approximate)   SpO2 98%  "  BMI 34.46 kg/m²     Physical Exam  Vitals and nursing note reviewed.   Constitutional:       General: She is not in acute distress.     Appearance: She is well-developed.   HENT:      Head: Normocephalic and atraumatic.      Nose: No congestion or rhinorrhea.   Eyes:      Conjunctiva/sclera: Conjunctivae normal.   Cardiovascular:      Rate and Rhythm: Normal rate and regular rhythm.      Heart sounds: No murmur heard.  Pulmonary:      Effort: Pulmonary effort is normal. No respiratory distress.      Breath sounds: Normal breath sounds.   Abdominal:      Palpations: Abdomen is soft.      Tenderness: There is no abdominal tenderness.   Musculoskeletal:         General: No swelling.      Cervical back: Normal range of motion.   Skin:     General: Skin is warm and dry.   Neurological:      Mental Status: She is alert and oriented to person, place, and time.   Psychiatric:         Mood and Affect: Mood normal.

## 2024-08-23 NOTE — PATIENT INSTRUCTIONS
"Patient Education     Routine physical for adults   The Basics   Written by the doctors and editors at [facility]   What is a physical? -- A physical is a routine visit, or \"check-up,\" with your doctor. You might also hear it called a \"wellness visit\" or \"preventive visit.\"  During each visit, the doctor will:   Ask about your physical and mental health   Ask about your habits, behaviors, and lifestyle   Do an exam   Give you vaccines if needed   Talk to you about any medicines you take   Give advice about your health   Answer your questions  Getting regular check-ups is an important part of taking care of your health. It can help your doctor find and treat any problems you have. But it's also important for preventing health problems.  A routine physical is different from a \"sick visit.\" A sick visit is when you see a doctor because of a health concern or problem. Since physicals are scheduled ahead of time, you can think about what you want to ask the doctor.  How often should I get a physical? -- It depends on your age and health. In general, for people age 21 years and older:   If you are younger than 50 years, you might be able to get a physical every 3 years.   If you are 50 years or older, your doctor might recommend a physical every year.  If you have an ongoing health condition, like diabetes or high blood pressure, your doctor will probably want to see you more often.  What happens during a physical? -- In general, each visit will include:   Physical exam - The doctor or nurse will check your height, weight, heart rate, and blood pressure. They will also look at your eyes and ears. They will ask about how you are feeling and whether you have any symptoms that bother you.   Medicines - It's a good idea to bring a list of all the medicines you take to each doctor visit. Your doctor will talk to you about your medicines and answer any questions. Tell them if you are having any side effects that bother you. You " "should also tell them if you are having trouble paying for any of your medicines.   Habits and behaviors - This includes:   Your diet   Your exercise habits   Whether you smoke, drink alcohol, or use drugs   Whether you are sexually active   Whether you feel safe at home  Your doctor will talk to you about things you can do to improve your health and lower your risk of health problems. They will also offer help and support. For example, if you want to quit smoking, they can give you advice and might prescribe medicines. If you want to improve your diet or get more physical activity, they can help you with this, too.   Lab tests, if needed - The tests you get will depend on your age and situation. For example, your doctor might want to check your:   Cholesterol   Blood sugar   Iron level   Vaccines - The recommended vaccines will depend on your age, health, and what vaccines you already had. Vaccines are very important because they can prevent certain serious or deadly infections.   Discussion of screening - \"Screening\" means checking for diseases or other health problems before they cause symptoms. Your doctor can recommend screening based on your age, risk, and preferences. This might include tests to check for:   Cancer, such as breast, prostate, cervical, ovarian, colorectal, prostate, lung, or skin cancer   Sexually transmitted infections, such as chlamydia and gonorrhea   Mental health conditions like depression and anxiety  Your doctor will talk to you about the different types of screening tests. They can help you decide which screenings to have. They can also explain what the results might mean.   Answering questions - The physical is a good time to ask the doctor or nurse questions about your health. If needed, they can refer you to other doctors or specialists, too.  Adults older than 65 years often need other care, too. As you get older, your doctor will talk to you about:   How to prevent falling at " home   Hearing or vision tests   Memory testing   How to take your medicines safely   Making sure that you have the help and support you need at home  All topics are updated as new evidence becomes available and our peer review process is complete.  This topic retrieved from [facility] on: May 02, 2024.  Topic 834169 Version 1.0  Release: 32.4.3 - C32.122  © 2024 UpToDate, Inc[employer] and/or its affiliates. All rights reserved.  Consumer Information Use and Disclaimer   Disclaimer: This generalized information is a limited summary of diagnosis, treatment, and/or medication information. It is not meant to be comprehensive and should be used as a tool to help the user understand and/or assess potential diagnostic and treatment options. It does NOT include all information about conditions, treatments, medications, side effects, or risks that may apply to a specific patient. It is not intended to be medical advice or a substitute for the medical advice, diagnosis, or treatment of a health care provider based on the health care provider's examination and assessment of a patient's specific and unique circumstances. Patients must speak with a health care provider for complete information about their health, medical questions, and treatment options, including any risks or benefits regarding use of medications. This information does not endorse any treatments or medications as safe, effective, or approved for treating a specific patient. Up[employer], Inc. and its affiliates disclaim any warranty or liability relating to this information or the use thereof.The use of this information is governed by the Terms of Use, available at https://www.woltersKeterauwer.com/en/know/clinical-effectiveness-terms. 2024© UpToDate, Inc. and its affiliates and/or licensors. All rights reserved.  Copyright   © 2024 [employer], [employer]. and/or its affiliates. All rights reserved.

## 2024-08-23 NOTE — ED PROVIDER NOTES
History  Chief Complaint   Patient presents with    Abdominal Pain     Right sided abd pain for the last few days. Took Dulcolax and had BM yesterday and pain was relieved. This morning pt ate a banana and pain returned.      Patient is a 44-year-old female who presents to the emergency department for right-sided abdominal pain.  Started this past Monday.  Thought it was related to constipation so she took Dulcolax.  She was able to have a bowel movement and the pain resolved.  However, the pain has now returned.  No other modifying factors.  Associated with dysuria.  No fevers or chills.  No prior history of pain like this in the past.  Has had 4 prior C-sections. Still has her gallbladder.  No other complaints or concerns.      Abdominal Pain  Associated symptoms: dysuria        Prior to Admission Medications   Prescriptions Last Dose Informant Patient Reported? Taking?   Multiple Vitamin (Multivitamin Adult) TABS   Yes No   Sig: Take by mouth   melatonin 3 mg   No No   Sig: Take 1 tablet (3 mg total) by mouth daily at bedtime   nystatin (MYCOSTATIN) cream   No No   Sig: Apply topically 2 (two) times a day   psyllium (METAMUCIL) 58.6 % powder   No No   Sig: Take 1 packet by mouth 3 (three) times a day   Patient not taking: Reported on 2024      Facility-Administered Medications: None       Past Medical History:   Diagnosis Date    HPV (human papilloma virus) infection     Sickle cell trait (HCC)        Past Surgical History:   Procedure Laterality Date     SECTION      4       Family History   Problem Relation Age of Onset    Asthma Mother     Hypertension Mother     Asthma Brother     Heart attack Brother     ADD / ADHD Son     Asthma Daughter     Eczema Daughter     ADD / ADHD Daughter     Febrile seizures Daughter     Cancer Maternal Grandmother      I have reviewed and agree with the history as documented.    E-Cigarette/Vaping    E-Cigarette Use Current Some Day User      E-Cigarette/Vaping  Substances    Nicotine No     THC Yes     CBD Yes     Flavoring No     Other No     Unknown No      Social History     Tobacco Use    Smoking status: Never    Smokeless tobacco: Current    Tobacco comments:     vape   Vaping Use    Vaping status: Some Days    Substances: THC, CBD   Substance Use Topics    Alcohol use: Not Currently     Comment: social    Drug use: Not Currently     Types: Marijuana     Comment: Use to smoke marijuana       Review of Systems   Gastrointestinal:  Positive for abdominal pain.   Genitourinary:  Positive for dysuria.   All other systems reviewed and are negative.      Physical Exam  Physical Exam  Vitals and nursing note reviewed.   Constitutional:       General: She is not in acute distress.     Appearance: She is well-developed. She is not diaphoretic.   HENT:      Head: Normocephalic and atraumatic.      Right Ear: External ear normal.      Left Ear: External ear normal.      Nose: Nose normal.   Eyes:      General: Lids are normal. No scleral icterus.  Cardiovascular:      Rate and Rhythm: Normal rate and regular rhythm.      Heart sounds: Normal heart sounds. No murmur heard.     No friction rub. No gallop.   Pulmonary:      Effort: Pulmonary effort is normal. No respiratory distress.      Breath sounds: Normal breath sounds. No wheezing or rales.   Abdominal:      Palpations: Abdomen is soft.      Tenderness: There is abdominal tenderness in the right upper quadrant. There is no guarding or rebound.       Musculoskeletal:         General: No deformity. Normal range of motion.      Cervical back: Normal range of motion and neck supple.   Skin:     General: Skin is warm and dry.   Neurological:      General: No focal deficit present.      Mental Status: She is alert.   Psychiatric:         Mood and Affect: Mood normal.         Behavior: Behavior normal.         Vital Signs  ED Triage Vitals   Temperature Pulse Respirations Blood Pressure SpO2   08/22/24 1441 08/22/24 1441 08/22/24  1441 08/22/24 1441 08/22/24 1441   99.4 °F (37.4 °C) 93 20 156/88 98 %      Temp Source Heart Rate Source Patient Position - Orthostatic VS BP Location FiO2 (%)   08/22/24 1441 08/22/24 1441 08/22/24 1441 08/22/24 1441 --   Oral Monitor Lying Right arm       Pain Score       08/22/24 1518       8           Vitals:    08/22/24 1441 08/22/24 1649   BP: 156/88 118/65   Pulse: 93 85   Patient Position - Orthostatic VS: Lying Sitting         Visual Acuity      ED Medications  Medications   ketorolac (TORADOL) injection 15 mg (15 mg Intravenous Given 8/22/24 1518)   iohexol (OMNIPAQUE) 350 MG/ML injection (MULTI-DOSE) 100 mL (100 mL Intravenous Given 8/22/24 1611)   cefTRIAXone (ROCEPHIN) IVPB (premix in dextrose) 1,000 mg 50 mL (0 mg Intravenous Stopped 8/22/24 1742)       Diagnostic Studies  Results Reviewed       Procedure Component Value Units Date/Time    Urine Microscopic [[id number]]  (Abnormal) Collected: 08/22/24 1552    Lab Status: Final result Specimen: Urine, Clean Catch Updated: 08/22/24 1612     RBC, UA None Seen /hpf      WBC, UA 30-50 /hpf      Epithelial Cells Moderate /hpf      Bacteria, UA Moderate /hpf      MUCUS THREADS Occasional    Urine culture [[id number]] Collected: 08/22/24 1552    Lab Status: In process Specimen: Urine, Clean Catch Updated: 08/22/24 1612    UA w Reflex to Microscopic w Reflex to Culture [[id number]]  (Abnormal) Collected: 08/22/24 1552    Lab Status: Final result Specimen: Urine, Clean Catch Updated: 08/22/24 1600     Color, UA Yellow     Clarity, UA Clear     Specific Gravity, UA 1.020     pH, UA 5.0     Leukocytes, UA Large     Nitrite, UA Negative     Protein, UA Negative mg/dl      Glucose, UA Negative mg/dl      Ketones, UA Trace mg/dl      Urobilinogen, UA <2.0 mg/dl      Bilirubin, UA Negative     Occult Blood, UA Small    POCT pregnancy, urine [[id number]]  (Normal) Resulted: 08/22/24 1559    Lab Status: Final result Updated: 08/22/24 1559     EXT Preg Test, Ur Negative      Control Valid    Comprehensive metabolic panel [[id number]]  (Abnormal) Collected: 08/22/24 1519    Lab Status: Final result Specimen: Blood from Arm, Left Updated: 08/22/24 1541     Sodium 134 mmol/L      Potassium 3.5 mmol/L      Chloride 99 mmol/L      CO2 24 mmol/L      ANION GAP 11 mmol/L      BUN 9 mg/dL      Creatinine 0.99 mg/dL      Glucose 89 mg/dL      Calcium 9.6 mg/dL      AST 17 U/L      ALT 13 U/L      Alkaline Phosphatase 89 U/L      Total Protein 9.0 g/dL      Albumin 4.5 g/dL      Total Bilirubin 0.61 mg/dL      eGFR 70 ml/min/1.73sq m     Narrative:      National Kid[employer] Foundation guidelines for Chronic Kidney Disease (CKD):     Stage 1 with normal or high GFR (GFR > 90 mL/min/1.73 square meters)    Stage 2 Mild CKD (GFR = 60-89 mL/min/1.73 square meters)    Stage 3A Moderate CKD (GFR = 45-59 mL/min/1.73 square meters)    Stage 3B Moderate CKD (GFR = 30-44 mL/min/1.73 square meters)    Stage 4 Severe CKD (GFR = 15-29 mL/min/1.73 square meters)    Stage 5 End Stage CKD (GFR <15 mL/min/1.73 square meters)  Note: GFR calculation is accurate only with a steady state creatinine    Lipase [[id number]]  (Normal) Collected: 08/22/24 1519    Lab Status: Final result Specimen: Blood from Arm, Left Updated: 08/22/24 1541     Lipase 11 u/L     CBC and differential [[id number]]  (Abnormal) Collected: 08/22/24 1519    Lab Status: Final result Specimen: Blood from Arm, Left Updated: 08/22/24 1525     WBC 11.00 Thousand/uL      RBC 4.66 Million/uL      Hemoglobin 12.8 g/dL      Hematocrit 39.1 %      MCV 84 fL      MCH 27.5 pg      MCHC 32.7 g/dL      RDW 13.6 %      MPV 10.5 fL      Platelets 384 Thousands/uL      nRBC 0 /100 WBCs      Segmented % 71 %      Immature Grans % 0 %      Lymphocytes % 16 %      Monocytes % 11 %      Eosinophils Relative 1 %      Basophils Relative 1 %      Absolute Neutrophils 7.73 Thousands/µL      Absolute Immature Grans 0.04 Thousand/uL      Absolute Lymphocytes 1.79  "Thousands/µL      Absolute Monocytes 1.22 Thousand/µL      Eosinophils Absolute 0.13 Thousand/µL      Basophils Absolute 0.09 Thousands/µL                    CT abdomen pelvis w contrast   Final Result by Benjy Wallace MD (08/22 1646)      Ascending urinary tract infection with acute right pyelonephritis.         The study was marked in EPIC for immediate notification.      Workstation performed: [id number]                    Procedures  Procedures         ED Course  ED Course as of 08/23/24 0711   u Aug 22, 2024   1654 CT abdomen pelvis w contrast  Ascending urinary tract infection with acute right pyelonephritis.   1747 Patient reevaluated.  Resting comfortably.  Received antibiotics for pyelonephritis.  Will discharge.  P.o. antibiotics sent to pharmacy.  Strict return precautions discussed.  Patient verbalized understanding and agreed to plan of care.                                               Medical Decision Making  Patient is a 44 y.o. female who presents to the ED for right-sided abdominal pain.  Patient is nontoxic, well-appearing.  Vitals are stable.  On exam there is tenderness to the right upper and right middle abdomen.    Differential diagnosis includes: Gastroenteritis, gastritis, pyelonephritis, acute hepatobiliary disease.  Low suspicion for acute pancreatitis, PUD (including perforation), acute appendicitis, vascular catastrophe, bowel obstruction or viscus perforation. Presentation not consistent with other acute, emergent causes of abdominal pain at this time.    Plan: labs, UA, CT AP, pain control, serial reassessment                 Portions of the record may have been created with voice recognition software. Occasional wrong word or \"sound a like\" substitutions may have occurred due to the inherent limitations of voice recognition software. Read the chart carefully and recognize, using context, where substitutions have occurred.    Amount and/or Complexity of Data Reviewed  Labs: " ordered.  Radiology: ordered. Decision-making details documented in ED Course.    Risk  Prescription drug management.                 Disposition  Final diagnoses:   Pyelonephritis     Time reflects when diagnosis was documented in both MDM as applicable and the Disposition within this note       Time User Action Codes Description Comment    8/22/2024  4:53 PM Kwesi Abdullahin Add [N12] Pyelonephritis           ED Disposition       ED Disposition   Discharge    Condition   Stable    Date/Time   u Aug 22, 2024  4:53 PM    Comment   Moizsonido Frias discharge to home/self care.                   Follow-up Information       Follow up With Specialties Details Why Contact Info Additional Information    [employer]  Call in 1 day  [phone]       [facility] Emergency Department Emergency Medicine   [address] [place] [address] [id number] [phone] [facility], [address], [place], [address]            Discharge Medication List as of 8/22/2024  5:47 PM        START taking these medications    Details   cefpodoxime (VANTIN) 200 mg tablet Take 1 tablet (200 mg total) by mouth 2 (two) times a day for 10 days, Starting Thu 8/22/2024, Until Sun 9/1/2024, Normal      ondansetron (ZOFRAN-ODT) 4 mg disintegrating tablet Take 1 tablet (4 mg total) by mouth every 6 (six) hours as needed for nausea or vomiting, Starting Thu 8/22/2024, Normal           CONTINUE these medications which have NOT CHANGED    Details   melatonin 3 mg Take 1 tablet (3 mg total) by mouth daily at bedtime, Starting Thu 7/27/2023, Normal      Multiple Vitamin (Multivitamin Adult) TABS Take by mouth, Historical Med      nystatin (MYCOSTATIN) cream Apply topically 2 (two) times a day, Starting Tue 2/20/2024, Normal      psyllium (METAMUCIL) 58.6 % powder Take 1 packet by mouth 3 (three) times a day, Starting Thu 7/27/2023, Normal             No discharge procedures on file.    PDMP  Review       None            ED Provider  Electronically Signed by             Abraham Abdullahi,   08/23/24 0711

## 2024-08-24 PROBLEM — N39.0 RECURRENT UTI (URINARY TRACT INFECTION): Status: ACTIVE | Noted: 2024-08-24

## 2024-08-24 LAB
BACTERIA UR CULT: ABNORMAL
BACTERIA UR CULT: ABNORMAL

## 2024-08-25 NOTE — ASSESSMENT & PLAN NOTE
Will consider prophylactic UTI medication but will focus on reducing constipation right now. Patient will monitor for fevers, severe pain, nausea/vomiting, reaction to cefpodoxime given from her ED visit yesterday. Explained that severe symptoms require emergency room trip for close monitoring

## 2024-08-27 ENCOUNTER — PATIENT OUTREACH (OUTPATIENT)
Age: 44
End: 2024-08-27

## 2024-08-27 NOTE — PROGRESS NOTES
Referral received from provider. Referral was noted to be for financial difficulties.     Chart review completed for potential medical needs  Chart review completed for the following sections:  Recent Vital Signs  Allergies/Contradictions  Medication Review   History   [facility]   Problem List  Immunizations  Past hospitalizations and major procedures, including surgery  Significant past illnesses and treatment history including: History and Physical and Medications/Infusions/Transfusions  Relevant past medications related to the patient's condition  Pt had annual physical on 8/23.     POCT A1C.  Results 5.5    Pt has recurrent UTI. Currently taking Vantin(antibiotic).     Pt does not have any chronic diseases that require medical management.     No additional needs noted.     RN CM will review if new referral is received.     Note routed to Maria C KING CM team.

## 2024-08-28 ENCOUNTER — PATIENT OUTREACH (OUTPATIENT)
Age: 44
End: 2024-08-28

## 2024-08-28 NOTE — PROGRESS NOTES
SWCM received incoming message from ELISHA Plunkett related to patient needs, financial difficulties.    SWCM completed chart review. SWCM called patient to follow up and assist with needs. SWCM unable to reach patient. Left voice message requesting return call. Contact information provided.      SWCM will continue to follow up and remain available to assist as needed.

## 2024-09-12 ENCOUNTER — PATIENT OUTREACH (OUTPATIENT)
Age: 44
End: 2024-09-12

## 2024-09-12 ENCOUNTER — ANNUAL EXAM (OUTPATIENT)
Age: 44
End: 2024-09-12

## 2024-09-12 VITALS
HEART RATE: 72 BPM | WEIGHT: 185 LBS | HEIGHT: 63 IN | OXYGEN SATURATION: 99 % | RESPIRATION RATE: 17 BRPM | BODY MASS INDEX: 32.78 KG/M2 | DIASTOLIC BLOOD PRESSURE: 68 MMHG | SYSTOLIC BLOOD PRESSURE: 112 MMHG

## 2024-09-12 DIAGNOSIS — Z12.31 ENCOUNTER FOR SCREENING MAMMOGRAM FOR BREAST CANCER: ICD-10-CM

## 2024-09-12 DIAGNOSIS — N39.0 RECURRENT UTI (URINARY TRACT INFECTION): ICD-10-CM

## 2024-09-12 DIAGNOSIS — Z87.42 HISTORY OF ABNORMAL CERVICAL PAP SMEAR: ICD-10-CM

## 2024-09-12 DIAGNOSIS — Z01.419 ENCOUNTER FOR ANNUAL ROUTINE GYNECOLOGICAL EXAMINATION: Primary | ICD-10-CM

## 2024-09-12 DIAGNOSIS — Z11.4 SCREENING FOR HIV (HUMAN IMMUNODEFICIENCY VIRUS): ICD-10-CM

## 2024-09-12 DIAGNOSIS — Z11.59 NEED FOR HEPATITIS C SCREENING TEST: ICD-10-CM

## 2024-09-12 DIAGNOSIS — Z12.4 SCREENING FOR CERVICAL CANCER: ICD-10-CM

## 2024-09-12 DIAGNOSIS — Z20.2 POSSIBLE EXPOSURE TO STI: ICD-10-CM

## 2024-09-12 DIAGNOSIS — N92.6 IRREGULAR MENSTRUATION: ICD-10-CM

## 2024-09-12 PROCEDURE — 81514 NFCT DS BV&VAGINITIS DNA ALG: CPT

## 2024-09-12 PROCEDURE — 87591 N.GONORRHOEAE DNA AMP PROB: CPT

## 2024-09-12 PROCEDURE — 87491 CHLMYD TRACH DNA AMP PROBE: CPT

## 2024-09-12 PROCEDURE — G0145 SCR C/V CYTO,THINLAYER,RESCR: HCPCS

## 2024-09-12 PROCEDURE — G0476 HPV COMBO ASSAY CA SCREEN: HCPCS

## 2024-09-12 PROCEDURE — 99396 PREV VISIT EST AGE 40-64: CPT | Performed by: OBSTETRICS & GYNECOLOGY

## 2024-09-12 RX ORDER — NITROFURANTOIN 25; 75 MG/1; MG/1
100 CAPSULE ORAL DAILY PRN
Qty: 30 CAPSULE | Refills: 0 | Status: SHIPPED | OUTPATIENT
Start: 2024-09-12

## 2024-09-12 NOTE — ASSESSMENT & PLAN NOTE
"Noting that \"periods have been spacing out over years\" stating she has been menstruating every every other month to every 2 months.  Additionally noting that menstruation has become lighter on days of bleeding and she has noticed more spotting.  Mentioning some signs of early menopause including some hot flashes and menopausal symptoms    Plan:  -Order TSH to r/o thyroid dysfunction   -LH/FSH for r/o early menopause  -Discussed herbal supplement for symptom management as needed  "

## 2024-09-12 NOTE — LETTER
09/12/24    Dear Molina Frias,    I am a Care Manager with [address] [address] [phone].  We have made several attempts to call you by phone.  It is important that you contact us back at [phone] so that we can assist with your care needs.     Sincerely,     Maria C Archibald, YOHANAW

## 2024-09-12 NOTE — PROGRESS NOTES
SWCM called patient to follow up and assist with needs. SWCM unable to reach patient (x2). Left voice message requesting return call. Contact information provided. SWCM sent UTR letter. SWCM closed case and removed self from care team.     SWCM will remain available to assist as needed.

## 2024-09-12 NOTE — ASSESSMENT & PLAN NOTE
Chronic, noting she has attempted to reduce constipation but is still noticing frequent UTI symptoms. Denies any current UTI symptoms.    Plan:  -Start macrobid 100 mg 2hrs post intercourse  -Recommend bladder emptying post intercourse  -Discussed increasing fluid intake

## 2024-09-13 ENCOUNTER — TELEPHONE (OUTPATIENT)
Age: 44
End: 2024-09-13

## 2024-09-13 DIAGNOSIS — B96.89 BV (BACTERIAL VAGINOSIS): Primary | ICD-10-CM

## 2024-09-13 DIAGNOSIS — N76.0 BV (BACTERIAL VAGINOSIS): Primary | ICD-10-CM

## 2024-09-13 LAB
C GLABRATA DNA VAG QL NAA+PROBE: NEGATIVE
C KRUSEI DNA VAG QL NAA+PROBE: NEGATIVE
C TRACH DNA SPEC QL NAA+PROBE: NEGATIVE
CANDIDA SP 6 PNL VAG NAA+PROBE: NEGATIVE
HPV HR 12 DNA CVX QL NAA+PROBE: NEGATIVE
HPV16 DNA CVX QL NAA+PROBE: NEGATIVE
HPV18 DNA CVX QL NAA+PROBE: NEGATIVE
N GONORRHOEA DNA SPEC QL NAA+PROBE: NEGATIVE
T VAGINALIS DNA VAG QL NAA+PROBE: NEGATIVE
VAGINOSIS/ITIS DNA PNL VAG PROBE+SIG AMP: POSITIVE

## 2024-09-13 RX ORDER — METRONIDAZOLE 7.5 MG/G
GEL TOPICAL 2 TIMES DAILY
Qty: 45 G | Refills: 0 | Status: SHIPPED | OUTPATIENT
Start: 2024-09-13

## 2024-09-13 NOTE — TELEPHONE ENCOUNTER
Called patient to update her on positive vaginal panel -- noting some vaginal itching present but not described yesterday - prescription called in

## 2024-09-19 LAB
LAB AP GYN PRIMARY INTERPRETATION: NORMAL
Lab: NORMAL

## 2024-09-23 PROBLEM — N39.0 RECURRENT UTI (URINARY TRACT INFECTION): Status: RESOLVED | Noted: 2024-08-24 | Resolved: 2024-09-23

## 2024-09-24 ENCOUNTER — TELEPHONE (OUTPATIENT)
Age: 44
End: 2024-09-24